# Patient Record
Sex: FEMALE
[De-identification: names, ages, dates, MRNs, and addresses within clinical notes are randomized per-mention and may not be internally consistent; named-entity substitution may affect disease eponyms.]

---

## 2019-04-18 PROBLEM — Z00.00 ENCOUNTER FOR PREVENTIVE HEALTH EXAMINATION: Status: ACTIVE | Noted: 2019-04-18

## 2019-04-19 ENCOUNTER — APPOINTMENT (OUTPATIENT)
Dept: MRI IMAGING | Facility: CLINIC | Age: 34
End: 2019-04-19
Payer: COMMERCIAL

## 2019-04-19 ENCOUNTER — OUTPATIENT (OUTPATIENT)
Dept: OUTPATIENT SERVICES | Facility: HOSPITAL | Age: 34
LOS: 1 days | End: 2019-04-19

## 2019-04-19 PROCEDURE — 70551 MRI BRAIN STEM W/O DYE: CPT | Mod: 26

## 2019-05-10 ENCOUNTER — APPOINTMENT (OUTPATIENT)
Dept: MRI IMAGING | Facility: CLINIC | Age: 34
End: 2019-05-10
Payer: COMMERCIAL

## 2019-05-10 ENCOUNTER — OUTPATIENT (OUTPATIENT)
Dept: OUTPATIENT SERVICES | Facility: HOSPITAL | Age: 34
LOS: 1 days | End: 2019-05-10

## 2019-05-10 PROCEDURE — 70553 MRI BRAIN STEM W/O & W/DYE: CPT | Mod: 26

## 2023-06-16 ENCOUNTER — APPOINTMENT (OUTPATIENT)
Dept: UROLOGY | Facility: CLINIC | Age: 38
End: 2023-06-16
Payer: COMMERCIAL

## 2023-06-16 VITALS
OXYGEN SATURATION: 97 % | HEIGHT: 67 IN | BODY MASS INDEX: 23.07 KG/M2 | WEIGHT: 147 LBS | TEMPERATURE: 98 F | DIASTOLIC BLOOD PRESSURE: 64 MMHG | SYSTOLIC BLOOD PRESSURE: 86 MMHG | RESPIRATION RATE: 16 BRPM | HEART RATE: 97 BPM

## 2023-06-16 DIAGNOSIS — N32.81 OVERACTIVE BLADDER: ICD-10-CM

## 2023-06-16 PROCEDURE — 51798 US URINE CAPACITY MEASURE: CPT

## 2023-06-16 PROCEDURE — 99204 OFFICE O/P NEW MOD 45 MIN: CPT

## 2023-06-16 NOTE — HISTORY OF PRESENT ILLNESS
[FreeTextEntry1] : Name GERMÁN LARSON \par MRN [id number] \par  1985 \par Contact Number: [phone]\par -----------------------------------------------------------------------------------------------------------------------------------------\par Date of First Visit: 23\par Referring Provider/PCP: Dr. Mirian Rahman (f. [phone])\par -----------------------------------------------------------------------------------------------------------------------------------------\par \par CC: urinary frequency\par \par History of Present Illness: GERMÁN LARSON is a 37 year old female who presents for evaluation of urinary frequency. Present for as long as patient can remember. Patient reports goes at least 20 times a day. Feels associated urgency. No episodes of incontinence. Nocturia 0-1x. Before bed, will have to go a few times. Pretty much constant. Unsure if empties bladder completely. No significant straining. No hesitancy.\par \par Patient also reports constant bloating/pressure in lower abdomen. Saw GI - unsure if GI versus suprapubic. GI work-up negative. Pressure is worse when bladder is full, but not always related to bladder fulness.\par \par Patient denies history of UTIs. Denies history of STIs. Denies history of hematuria\par \par Bladder triggers: caffeine 1-2 cups coffee, no tea, no carbonation, no citrus, no spicy foods, +tomatoes but not daily, no pineapple \par \par Comorbid conditions: occipital neuralgia and brain cavernoma - monitored with brain MRI yearly, stable since 2019, no mobility deficits, no DM, no constipation, no prior pelvic surgeries\par \par Patient had recent TV US which showed small fibroids.\par \par PVR 38\par \par PMH: cavernoma, occipital neuralgia\par PSH: none\par Family History: no  malignancies\par Social:  at [employer], single, no children, never smoker, 1 drink/week, no recreational drugs\par Meds: none\par Allergies: doxycxyline - rash, hives\par ROS: no fevers, chills

## 2023-06-16 NOTE — ASSESSMENT
[FreeTextEntry1] : 37 year old with history of occipital neuroma and brain cavernoma years of urinary frequency, urgency, suprapubic pressure. We discussed OAB is a clinical diagnosis characterized by the presence of bothersome urinary symptoms with a variable and chronic course that needs to be managed over time, that it primarily affects QOL, that there is no single ideal treatment and that available treatments vary in the effort required from the patient as well as in invasiveness, risk of adverse events and reversibility.First line treatment is behavioral modifications: bladder training with pelvic floor physical therapy, fluid management, caffeine reduction, dietary changes (avoiding bladder irritants). Next line oral anti-muscarinics or oral ß3-adrenoceptor agonists. Third line PTNS and neuromodulation. Fourth line more invasive surgical treatment. We discussed given her neuro history, would recommend further evaluation, likely UDS. Will reefer to Dr. Avelar for further evaluation.\par \par Patient also reports suprapubic pressure, worse when bladder full. Suspect element pelvic floor dysfunction. With regards to her frequency and PFD, we discussed behavioral modification strategies including fluid restriction or additional hydration; avoidance of certain foods known to be common bladder irritants such as coffee or citrus products; use of an elimination diet to determine which foods or fluids may contribute to symptoms; pelvic floor muscle relaxation and bladder training with urge suppression with pelvic floor PT. \par \par \par - UA, urine culture\par - pelvic floor PT - [facility] referral placed\par - referral to Dr. Avelar given neuro history, patient made appt for 6/28/23 and Dr. Avelar informed

## 2023-06-16 NOTE — LETTER BODY
[Dear  ___] : Dear  [unfilled], [Courtesy Letter:] : I had the pleasure of seeing your patient, [unfilled], in my office today. [Please see my note below.] : Please see my note below. [Consult Closing:] : Thank you very much for allowing me to participate in the care of this patient.  If you have any questions, please do not hesitate to contact me. [Sincerely,] : Sincerely, [FreeTextEntry3] : Stephenie Macdonald MD\par Director of Robotic Education\par [facility]\par \par [email or web address]\par [phone] ([place])\par [phone]  ([facility])

## 2023-06-16 NOTE — PHYSICAL EXAM
[General Appearance - Well Developed] : well developed [General Appearance - Well Nourished] : well nourished [Normal Appearance] : normal appearance [Well Groomed] : well groomed [General Appearance - In No Acute Distress] : no acute distress [Edema] : no peripheral edema [] : no respiratory distress [Respiration, Rhythm And Depth] : normal respiratory rhythm and effort [Exaggerated Use Of Accessory Muscles For Inspiration] : no accessory muscle use [Abdomen Soft] : soft [Abdomen Tenderness] : non-tender [Costovertebral Angle Tenderness] : no ~M costovertebral angle tenderness [Urethral Meatus] : the meatus of the urethra showed no abnormalities [Normal Station and Gait] : the gait and station were normal for the patient's age [FreeTextEntry1] : mild tightness and tenderness L pelvic floor

## 2023-06-19 LAB
APPEARANCE: CLEAR
BACTERIA UR CULT: NORMAL
BACTERIA: NEGATIVE /HPF
BILIRUBIN URINE: NEGATIVE
BLOOD URINE: NEGATIVE
CAST: 0 /LPF
COLOR: YELLOW
EPITHELIAL CELLS: 1 /HPF
GLUCOSE QUALITATIVE U: NEGATIVE MG/DL
KETONES URINE: NEGATIVE MG/DL
LEUKOCYTE ESTERASE URINE: NEGATIVE
MICROSCOPIC-UA: NORMAL
NITRITE URINE: NEGATIVE
PH URINE: 7
PROTEIN URINE: NEGATIVE MG/DL
RED BLOOD CELLS URINE: 0 /HPF
SPECIFIC GRAVITY URINE: 1.01
UROBILINOGEN URINE: 0.2 MG/DL
WHITE BLOOD CELLS URINE: 0 /HPF

## 2023-06-20 ENCOUNTER — NON-APPOINTMENT (OUTPATIENT)
Age: 38
End: 2023-06-20

## 2023-06-22 ENCOUNTER — TRANSCRIPTION ENCOUNTER (OUTPATIENT)
Age: 38
End: 2023-06-22

## 2023-06-28 ENCOUNTER — APPOINTMENT (OUTPATIENT)
Dept: UROLOGY | Facility: CLINIC | Age: 38
End: 2023-06-28